# Patient Record
Sex: MALE | Race: WHITE
[De-identification: names, ages, dates, MRNs, and addresses within clinical notes are randomized per-mention and may not be internally consistent; named-entity substitution may affect disease eponyms.]

---

## 2017-10-02 ENCOUNTER — HOSPITAL ENCOUNTER (EMERGENCY)
Dept: HOSPITAL 25 - UCCORT | Age: 27
Discharge: HOME | End: 2017-10-02
Payer: COMMERCIAL

## 2017-10-02 VITALS — DIASTOLIC BLOOD PRESSURE: 59 MMHG | SYSTOLIC BLOOD PRESSURE: 124 MMHG

## 2017-10-02 DIAGNOSIS — K13.79: Primary | ICD-10-CM

## 2017-10-02 PROCEDURE — 99211 OFF/OP EST MAY X REQ PHY/QHP: CPT

## 2017-10-02 PROCEDURE — G0463 HOSPITAL OUTPT CLINIC VISIT: HCPCS

## 2017-10-02 NOTE — UC
Dental HPI





- HPI Summary


HPI Summary: 





28 yo male with lower lip mass x 1 month


growing in size


onset possibly after biting lip














- History of Current Complaint


Chief Complaint: UCSkin


Stated Complaint: LIP COMPLAINT


Time Seen by Provider: 10/02/17 16:17


Hx Obtained From: Patient


Onset/Duration: Gradual Onset, Lasting Weeks


Severity: Mild


Pain Intensity: 0


Pain Scale Used: 0-10 Numeric


Aggravating Factor(s): Nothing


Alleviating Factor(s): Nothing


Related History: Swelling





- Allergies/Home Medications


Allergies/Adverse Reactions: 


 Allergies











Allergy/AdvReac Type Severity Reaction Status Date / Time


 


No Known Allergies Allergy   Verified 10/02/17 16:10











Home Medications: 


 Home Medications





NK [No Home Medications Reported]  10/02/17 [History Confirmed 10/02/17]











PMH/Surg Hx/FS Hx/Imm Hx


Previously Healthy: Yes





- Surgical History


Surgical History: Yes


Surgery Procedure, Year, and Place: t&a as a child





- Family History


Known Family History: Positive: Cardiac Disease, Hypertension, Diabetes





- Social History


Alcohol Use: None


Substance Use Type: None


Smoking Status (MU): Never Smoked Tobacco





Review of Systems


Constitutional: Negative


Skin: Negative


Eyes: Negative


ENT: Negative


Respiratory: Negative


Cardiovascular: Negative


Gastrointestinal: Negative


Genitourinary: Negative


Motor: Negative


Neurovascular: Negative


Musculoskeletal: Negative


Neurological: Negative


Psychological: Negative


Is Patient Immunocompromised?: No


All Other Systems Reviewed And Are Negative: Yes





Physical Exam


Triage Information Reviewed: Yes


Appearance: Well-Appearing, No Pain Distress


Vital Signs: 


 Initial Vital Signs











Temp  98.5 F   10/02/17 16:11


 


Pulse  52   10/02/17 16:11


 


Resp  16   10/02/17 16:11


 


BP  124/59   10/02/17 16:11


 


Pulse Ox  100   10/02/17 16:11











Vital Signs Reviewed: Yes


Eyes: Positive: Conjunctiva Clear


ENT: Positive: Hearing grossly normal.  Negative: Nasal congestion, Nasal 

drainage, Tonsillar swelling, Tonsillar exudate, Trismus, Muffled/hoarse voice


Dental: Negative: Gross Decay/Caries @, Dental Fracture @, Abscess @


Neck: Positive: Supple, Nontender, No Lymphadenopathy


Respiratory: Positive: Lungs clear, Normal breath sounds, No respiratory 

distress, No accessory muscle use


Cardiovascular: Positive: RRR, No Murmur


Neurological: Positive: Alert


Psychological Exam: Normal


Skin Exam: Normal





Dental Complaint Course/Dx





- Differential Dx/Diagnosis


Provider Diagnoses: mucocele





Discharge





- Discharge Plan


Condition: Stable


Disposition: HOME


Referrals: 


Lamont Wiley MD [Doctor of Dental Medicine] - 


Edi Elena MD [Doctor of Dental Medicine] - 


Additional Instructions: 


I think you have a mucocele





I suggest you see an oral surgeon





Images


Dental: 


  __________________________














  __________________________





 1 - nodular mass noted lower lip/rubbery in texture and mobile

## 2018-12-24 ENCOUNTER — HOSPITAL ENCOUNTER (EMERGENCY)
Dept: HOSPITAL 25 - UCEAST | Age: 28
Discharge: HOME | End: 2018-12-24
Payer: SELF-PAY

## 2018-12-24 VITALS — DIASTOLIC BLOOD PRESSURE: 80 MMHG | SYSTOLIC BLOOD PRESSURE: 122 MMHG

## 2018-12-24 DIAGNOSIS — S80.01XA: Primary | ICD-10-CM
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
## 2018-12-24 PROCEDURE — 99211 OFF/OP EST MAY X REQ PHY/QHP: CPT

## 2018-12-24 PROCEDURE — G0463 HOSPITAL OUTPT CLINIC VISIT: HCPCS

## 2018-12-24 NOTE — UC
Lower Extremity/Ankle HPI





- HPI Summary


HPI Summary: 





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 

some tenderness but ambulation is intact





- History of Current Complaint


Chief Complaint: UCLowerExtremity


Stated Complaint: KNEE INJURY


Time Seen by Provider: 12/24/18 13:09


Hx Obtained From: Patient


Onset/Duration: Sudden Onset


Severity Initially: Mild


Severity Currently: Mild


Pain Intensity: 3


Alleviating Factor(s): Rest





- Allergies/Home Medications


Allergies/Adverse Reactions: 


 Allergies











Allergy/AdvReac Type Severity Reaction Status Date / Time


 


No Known Allergies Allergy   Verified 12/24/18 12:35














PMH/Surg Hx/FS Hx/Imm Hx


Previously Healthy: Yes





- Surgical History


Surgical History: Yes


Surgery Procedure, Year, and Place: t&a as a child





- Family History


Known Family History: Positive: Cardiac Disease, Hypertension, Diabetes





- Social History


Alcohol Use: Occasionally


Substance Use Type: None


Smoking Status (MU): Never Smoked Tobacco





Review of Systems


All Other Systems Reviewed And Are Negative: Yes


Constitutional: Positive: Negative


Skin: Positive: Negative


Eyes: Positive: Negative


ENT: Positive: Negative


Respiratory: Positive: Negative


Cardiovascular: Positive: Negative


Gastrointestinal: Positive: Negative


Genitourinary: Positive: Negative


Motor: Positive: Negative


Neurovascular: Positive: Negative


Musculoskeletal: Positive: Other: - right knee pain


Neurological: Positive: Negative


Psychological: Positive: Negative


Is Patient Immunocompromised?: Yes





Physical Exam





- Summary


Physical Exam Summary: 


Vital Signs Reviewed: Yes


Skin: Positive: Warm


Head/Face: Positive: Normal Head/Face Inspection


Eyes: Positive: Normal


ENT: Positive: Normal ENT inspection


Neck: Positive: Supple


Respiratory/Lung Sounds: Positive: Clear to Auscultation


Cardiovascular: Positive: Normal, RRR, S1, S2


Abdomen Description: Positive: Nontender


Musculoskeletal: Positive:right knee - left mejia-patellar tenderness with 

ecchymosis ROM intact, NVI, minimal swelling


Neurological: Positive: Normal


Psychiatric: Positive: Normal, Affect/Mood Appropriate





Vital Signs: 


 Initial Vital Signs











Temp  36.6 C   12/24/18 12:32


 


Pulse  60   12/24/18 12:32


 


Resp  16   12/24/18 12:32


 


BP  122/80   12/24/18 12:32


 


Pulse Ox  100   12/24/18 12:32














Lower Extremity Course/Dx





- Course


Course Of Treatment: Right knee pain- XR of right knee neg for fx, ACE wrap, 

RICE





- Differential Dx/Diagnosis


Provider Diagnosis: 


 Contusion of right knee








Discharge





- Sign-Out/Discharge


Documenting (check all that apply): Patient Departure


All imaging exams completed and their final reports reviewed: Yes





- Discharge Plan


Condition: Stable


Disposition: HOME


Patient Education Materials:  Knee Pain (ED)


Referrals: 


Julio Chen MD [Primary Care Provider] - 





- Billing Disposition and Condition


Condition: STABLE


Disposition: Home

## 2019-04-30 ENCOUNTER — HOSPITAL ENCOUNTER (EMERGENCY)
Dept: HOSPITAL 25 - UCEAST | Age: 29
Discharge: HOME | End: 2019-04-30
Payer: COMMERCIAL

## 2019-04-30 VITALS — DIASTOLIC BLOOD PRESSURE: 70 MMHG | SYSTOLIC BLOOD PRESSURE: 125 MMHG
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
## 2019-04-30 DIAGNOSIS — S20.211A: Primary | ICD-10-CM

## 2019-04-30 DIAGNOSIS — S16.1XXA: ICD-10-CM
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
## 2019-04-30 PROCEDURE — G0463 HOSPITAL OUTPT CLINIC VISIT: HCPCS

## 2019-04-30 PROCEDURE — 99211 OFF/OP EST MAY X REQ PHY/QHP: CPT

## 2019-04-30 NOTE — UC
Minor Trauma HPI





- HPI Summary


HPI Summary: 


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 

HIS FACE AND FELL OVER A CHAIR STRIKING HIS RIGHT RIB CAGE. HAS RIGHT SIDED 

NECK PAIN AND RIB CAGE PAIN. WORSE WITH DEEP BREATHS, MVMT AND COUGHING. NO 

SOB. NO BRUISING. NO LOC. NO TRISMUS.





- History of Current Complaint


Chief Complaint: UCTrauma


Stated Complaint: NECK/HEAD/BACK INJURY


Time Seen by Provider: 04/30/19 14:37


Hx Obtained From: Patient


Onset/Duration: Sudden Onset, Lasting Hours, Still Present


Onset Of Pain: Immediate


Severity Initially: Moderate


Severity Currently: Moderate


Pain Intensity: 5


Pain Scale Used: 0-10 Numeric


Mechanism Of Injury: Blunt Trauma


Aggravating Factor(s): Coughing, Deep Breaths, Movement


Alleviating Factor(s): Rest


Associated Signs And Symptoms: Negative: Loss Of Consciousness





- Allergies/Home Medications


Allergies/Adverse Reactions: 


 Allergies











Allergy/AdvReac Type Severity Reaction Status Date / Time


 


No Known Allergies Allergy   Verified 04/30/19 14:19














PMH/Surg Hx/FS Hx/Imm Hx


Previously Healthy: Yes





- Surgical History


Surgical History: Yes


Surgery Procedure, Year, and Place: t&a as a child





- Family History


Known Family History: Positive: Cardiac Disease, Hypertension, Diabetes





- Social History


Alcohol Use: Occasionally


Substance Use Type: Marijuana


Smoking Status (MU): Never Smoked Tobacco





Review of Systems


All Other Systems Reviewed And Are Negative: Yes


Constitutional: Positive: Negative


Skin: Negative: Bruising


Respiratory: Positive: Negative


Cardiovascular: Positive: Negative


Gastrointestinal: Positive: Negative


Musculoskeletal: Positive: Arthralgia, Decreased ROM, Myalgia


Neurological: Positive: Negative





Physical Exam


Triage Information Reviewed: Yes


Appearance: Well-Appearing, No Pain Distress, Well-Nourished


Vital Signs: 


 Initial Vital Signs











Temp  97.8 F   04/30/19 14:13


 


Pulse  64   04/30/19 14:13


 


Resp  18   04/30/19 14:13


 


BP  125/70   04/30/19 14:13


 


Pulse Ox  100   04/30/19 14:13











Vital Signs Reviewed: Yes


Eyes: Positive: Conjunctiva Clear


ENT: Positive: Hearing grossly normal, TMs normal.  Negative: Trismus


Neck: Positive: Supple, No Lymphadenopathy, Other: - TTP RIGHT TRAPEZIOUS MUSCLE


Respiratory Exam: Normal


Cardiovascular Exam: Normal


Abdomen Description: Positive: Soft


Musculoskeletal: Positive: No Edema, ROM Limited @ - NECK, Other: - TTP RIGHT 

TRAPEZIUS MUSCLE. TTP RIGHT POSTEROLATERAL RIB CAGE.


Neurological: Positive: Alert


Psychological: Positive: Age Appropriate Behavior


Skin: Negative: Breakdown





Diagnostics





- Radiology


  ** RIGHT RIB XRAYS


Radiology Interpretation Completed By: Radiologist


Summary of Radiographic Findings: No radiographically apparent displaced rib 

fracture or pneumothorax.





Minor Trauma Course/Dx





- Differential Dx/Diagnosis


Provider Diagnosis: 


 Contusion of rib on right side, Cervical strain, acute








Discharge





- Sign-Out/Discharge


Documenting (check all that apply): Patient Departure


All imaging exams completed and their final reports reviewed: Yes





- Discharge Plan


Condition: Stable


Disposition: HOME


Patient Education Materials:  Cervical Strain (ED), Rib Contusion (ED)


Forms:  *Work Release


Referrals: 


Julio Chen MD [Primary Care Provider] - If Needed


Additional Instructions: 


XRAYS TODAY UNREMARKABLE. YOUR SYMPTOMS SHOULD IMPROVE SIGNIFICANTLY OVER THE 

NEXT 1-2 WEEKS. IF YOU DO NOT IMPROVE AS EXPECTED FOLLOW-UP WITH YOUR PCP.  OTC 

IBUPROFEN OR ALEVE AS NEEDED FOR DISCOMFORT.





RIB INJURIES AND FRACTURES:


     You have been diagnosed as having either bruised or broken ribs. These two 

injuries are treated in the same way.  It will usually take four to six weeks 

for these injured ribs to heal.


     Sometimes, rib belts or anesthetic injections of the chest wall help 

reduce the pain. You should cough or take a deep breath at least every hour or 

two to prevent lung complications.


     You should not engage in any strenuous physical activity until released by 

your physician.  The usual rule is "if it hurts, don't do it."


     Rib fractures can lead to serious lung complications including lung 

collapse, hemorrhage, and pneumonia.  You should go to the ED if any of the 

following occur: 


(1) Fever or chills. 


(2) Persistent cough, coughing up blood, or shortness of breath. 


(3) Increasing pain. 


(4) Weakness, lightheadedness, or fainting.





Be sure to take slow deep breaths several times daily to help keep your lungs 

expanded.





- Billing Disposition and Condition


Condition: STABLE


Disposition: Home
2

## 2019-06-24 ENCOUNTER — HOSPITAL ENCOUNTER (EMERGENCY)
Dept: HOSPITAL 25 - UCCORT | Age: 29
Discharge: HOME | End: 2019-06-24
Payer: COMMERCIAL

## 2019-06-24 VITALS — SYSTOLIC BLOOD PRESSURE: 109 MMHG | DIASTOLIC BLOOD PRESSURE: 69 MMHG

## 2019-06-24 DIAGNOSIS — S61.211A: Primary | ICD-10-CM
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
## 2019-06-24 PROCEDURE — G0463 HOSPITAL OUTPT CLINIC VISIT: HCPCS

## 2019-06-24 PROCEDURE — 12001 RPR S/N/AX/GEN/TRNK 2.5CM/<: CPT

## 2019-06-24 PROCEDURE — 99211 OFF/OP EST MAY X REQ PHY/QHP: CPT

## 2019-06-24 PROCEDURE — 90715 TDAP VACCINE 7 YRS/> IM: CPT

## 2019-06-24 NOTE — UC
Laceration HPI





- HPI Summary


HPI Summary: 





Pt presents with c/o laceration to left index finger just distal to mip joint 

on yusuf aspect. Pt was cutting chicken at home with a fillet knife.  Pt had 

last tetanus in 2013. 





- History Of Current Complaint


Chief Complaint: UCLaceration


Stated Complaint: LEFT POINTER FINGER LAC


Time Seen by Provider: 06/24/19 19:06


Hx Obtained From: Patient


Laceration Location: Finger - left index


Mechanism Of Injury: Sharp Trauma - knife


Onset/Duration: Sudden Onset


Severity: Mild


Pain Intensity: 2


Aggravating Factors: Position, Movement


Related History: Dominant Hand Right





- Allergies/Home Medications


Allergies/Adverse Reactions: 


 Allergies











Allergy/AdvReac Type Severity Reaction Status Date / Time


 


No Known Allergies Allergy   Verified 06/24/19 18:41














PMH/Surg Hx/FS Hx/Imm Hx


Previously Healthy: Yes





- Surgical History


Surgical History: Yes


Surgery Procedure, Year, and Place: t&a as a child





- Family History


Known Family History: Positive: Cardiac Disease, Hypertension, Diabetes





- Social History


Occupation: Employed Full-time


Lives: With Family


Alcohol Use: Occasionally


Substance Use Type: None


Smoking Status (MU): Never Smoked Tobacco


Have You Smoked in the Last Year: No





- Immunization History


Most Recent Tetanus Shot: 2013


Vaccination Up to Date: Yes





Review of Systems


All Other Systems Reviewed And Are Negative: Yes


Constitutional: Positive: Negative


Skin: Positive: Other - laceration left index finger


Eyes: Positive: Negative


ENT: Positive: Negative


Respiratory: Positive: Negative


Cardiovascular: Positive: Negative


Gastrointestinal: Positive: Negative


Genitourinary: Positive: Negative


Motor: Positive: Negative


Neurovascular: Positive: Negative


Musculoskeletal: Positive: Myalgia - laceration site


Neurological: Positive: Negative


Psychological: Positive: Negative


Is Patient Immunocompromised?: No





Physical Exam


Triage Information Reviewed: Yes


Appearance: Well-Appearing


Vital Signs: 


 Initial Vital Signs











Temp  98 F   06/24/19 18:34


 


Pulse  93   06/24/19 18:34


 


Resp  18   06/24/19 18:34


 


BP  109/69   06/24/19 18:34


 


Pulse Ox  96   06/24/19 18:34











Vital Signs Reviewed: Yes


Eye Exam: Normal


ENT Exam: Normal


Dental Exam: Normal


Neck exam: Normal


Respiratory Exam: Normal


Respiratory: Positive: No respiratory distress


Musculoskeletal Exam: Normal


Musculoskeletal: Positive: Strength Intact, ROM Intact


Neurological Exam: Normal


Neurological: Positive: Muscle Tone Normal


Psychological Exam: Normal


Skin Exam: Other - laceration left index finger





Laceration Repair





- Laceration Repair


  ** 1


Description: Linear


Laceration Size After Repair: Length (cm) - 1.5, Width (mm) - 3, Depth (mm) - 3


Modified For Repair: No


Cleansing Completed Via Routine Prep: Yes


Irrigation With Pressure Irrigation Device: Yes


Closure Material: Skin Adhesive, SteriStrips


Closure Method: Single Layer


Suture Of: Skin





Laceration Course/Dx





- Differential Dx - Laceration/Wound


Differental Diagnoses: Laceration





- Diagnosis


Provider Diagnosis: 


 Laceration of left index finger








Discharge





- Sign-Out/Discharge


Documenting (check all that apply): Patient Departure


All imaging exams completed and their final reports reviewed: No Studies





- Discharge Plan


Condition: Stable


Disposition: HOME


Patient Education Materials:  Laceration (ED), Skin Adhesive Care (ED)


Forms:  *Work Release


Referrals: 


Julio Chen MD [Primary Care Provider] - If Needed


Additional Instructions: 


Please do not remove the finger splint unless you need to change the tape.  

Please keep the splint clean and dry. PLease do not apply antibiotic ointment 

to your wound.  Please keep your splint on for a minimum of 5 days do not 

remove even at night. 





- Billing Disposition and Condition


Condition: STABLE


Disposition: Home